# Patient Record
Sex: FEMALE | Race: ASIAN | NOT HISPANIC OR LATINO | URBAN - METROPOLITAN AREA
[De-identification: names, ages, dates, MRNs, and addresses within clinical notes are randomized per-mention and may not be internally consistent; named-entity substitution may affect disease eponyms.]

---

## 2024-02-05 ASSESSMENT — DERMATOLOGY QUALITY OF LIFE (QOL) ASSESSMENT
RATE HOW EMOTIONALLY BOTHERED YOU ARE BY YOUR SKIN PROBLEM (FOR EXAMPLE, WORRY, EMBARRASSMENT, FRUSTRATION): 5
RATE HOW BOTHERED YOU ARE BY SYMPTOMS OF YOUR SKIN PROBLEM (EG, ITCHING, STINGING BURNING, HURTING OR SKIN IRRITATION): 3
RATE HOW BOTHERED YOU ARE BY EFFECTS OF YOUR SKIN PROBLEMS ON YOUR ACTIVITIES (EG, GOING OUT, ACCOMPLISHING WHAT YOU WANT, WORK ACTIVITIES OR YOUR RELATIONSHIPS WITH OTHERS): 0 - NEVER BOTHERED
RATE HOW BOTHERED YOU ARE BY SYMPTOMS OF YOUR SKIN PROBLEM (EG, ITCHING, STINGING BURNING, HURTING OR SKIN IRRITATION): 3
RATE HOW BOTHERED YOU ARE BY EFFECTS OF YOUR SKIN PROBLEMS ON YOUR ACTIVITIES (EG, GOING OUT, ACCOMPLISHING WHAT YOU WANT, WORK ACTIVITIES OR YOUR RELATIONSHIPS WITH OTHERS): 0 - NEVER BOTHERED
RATE HOW EMOTIONALLY BOTHERED YOU ARE BY YOUR SKIN PROBLEM (FOR EXAMPLE, WORRY, EMBARRASSMENT, FRUSTRATION): 5

## 2024-02-07 ENCOUNTER — OFFICE VISIT (OUTPATIENT)
Dept: DERMATOLOGY | Facility: CLINIC | Age: 24
End: 2024-02-07
Payer: COMMERCIAL

## 2024-02-07 DIAGNOSIS — L30.9 DERMATITIS OF LIP: Primary | ICD-10-CM

## 2024-02-07 DIAGNOSIS — K13.0 PERLECHE: ICD-10-CM

## 2024-02-07 PROCEDURE — 99204 OFFICE O/P NEW MOD 45 MIN: CPT | Performed by: STUDENT IN AN ORGANIZED HEALTH CARE EDUCATION/TRAINING PROGRAM

## 2024-02-07 PROCEDURE — 1036F TOBACCO NON-USER: CPT | Performed by: STUDENT IN AN ORGANIZED HEALTH CARE EDUCATION/TRAINING PROGRAM

## 2024-02-07 RX ORDER — HYDROCORTISONE 25 MG/G
1 OINTMENT TOPICAL 2 TIMES DAILY
COMMUNITY
Start: 2023-09-08

## 2024-02-07 RX ORDER — FLUTICASONE PROPIONATE 50 MCG
2 SPRAY, SUSPENSION (ML) NASAL
COMMUNITY
Start: 2021-04-19 | End: 2024-10-30

## 2024-02-07 RX ORDER — KETOCONAZOLE 20 MG/ML
1 SHAMPOO, SUSPENSION TOPICAL 2 TIMES WEEKLY
COMMUNITY
Start: 2023-11-12

## 2024-02-07 RX ORDER — CLOBETASOL PROPIONATE 0.46 MG/ML
2 SOLUTION TOPICAL 2 TIMES DAILY
COMMUNITY
Start: 2023-09-08

## 2024-02-07 RX ORDER — NORETHINDRONE ACETATE AND ETHINYL ESTRADIOL, ETHINYL ESTRADIOL AND FERROUS FUMARATE 1MG-10(24)
1 KIT ORAL DAILY
COMMUNITY

## 2024-02-07 NOTE — PROGRESS NOTES
Subjective   Chrystal Ortiz is a 23 y.o. female who presents for the following: Eczema (Dx with eczema at 10 years of age.  Currently using Hydrocortisone ointment 2.5% 1-2 times daily for perioral area, 2 weeks on and 1 week off (has been using since October 2023), does not note much improvement.  Notes she went to another dermatologist and has body eczema under control with Clobetasol. Also using Ketoconazole shampoo and Clobetasol solution for scalp.)      Objective   Well appearing patient in no apparent distress; mood and affect are within normal limits.    A focused examination was performed including head, including the scalp, face, neck, nose, ears, eyelids, and lips. All findings within normal limits unless otherwise noted below.    Left Oral Commissure, Right Oral Commissure, Right Upper Cutaneous Lip  At corners of mouth - erythematous edematous and fissured papules      Assessment/Plan   Dermatitis of lip    Related Procedures  Follow Up In Dermatology - Established Patient    Related Medications  mupirocin 2 % ointment kit  Apply 1 Application topically 2 times a day.    Perleche  Right Upper Cutaneous Lip; Left Oral Commissure; Right Oral Commissure    Lip dermatitis, perleche  Discussed nature of condition, can be related to wearing retainer at night, and moisture accumulation at corners of mouth, leading to chronic irritant dermatitis, sometimes complicated by yeast or bacterial overgrowth  Advised applying vaseline liberally to lips and cutaneous lips at night before bedtime. Consider trying donut pillow to encourage sleeping on back. Apply mupirocin 2% oint 2-3 times per day to the affected areas. Discussed we may need to add back in topical steroid or anti-yeast if insufficient improvement at next visit  Discussed less common reasons for dermatitis of lips including beeswax, cinnamon, or other flavorants in toothpaste, chewing gum, lip balm. She is unsure of toothpaste, but does not use flavored lip  balm (only aquaphor).     Rtc 3-4 weeks to re-assess          Scribe Attestation  By signing my name below, I, Dominique Tieno LPN , Scribe   attest that this documentation has been prepared under the direction and in the presence of Glenn Palomo MD.

## 2024-02-27 NOTE — PROGRESS NOTES
Subjective   Chrystal Otriz is a 23 y.o. female who presents for the following: Dermatitis (Follow up lip dermatitis 2/7/24.  Pt is currently using Aquaphor and vaseline multiple times daily and mixing mupirocin with hydrocortisone 2.5%. Notes improvement.  No longer has burning sensation from Mupirocin alone.)    Good improvement over the last 14 days of using products. Happy with results so far    Objective   Well appearing patient in no apparent distress; mood and affect are within normal limits.    A focused examination was performed including head, including the scalp, face, neck, nose, ears, eyelids, and lips. All findings within normal limits unless otherwise noted below.    decreased erythema, fissuring and pigmentation at corners of mouth    Assessment/Plan   Dermatitis of lip    Related Procedures  Follow Up In Dermatology - Established Patient  Follow Up In Dermatology - Established Patient    Related Medications  mupirocin 2 % ointment kit  Apply 1 Application topically 2 times a day.       [place]  Right Upper Cutaneous Lip; Left Oral Commissure; Right Oral Commissure   Lip dermatitis, ray  Advised applying vaseline liberally to lips and cutaneous lips at night before bedtime.   Continue to apply mupirocin 2% oint and hydrocortisone 2.5% oint times per day to the affected areas until resolved.   Will see if we need to add anti-yeast if insufficient improvement at next visit      Scribe Attestation  By signing my name below, Dominique BUCKLEY LPN , Scribe   attest that this documentation has been prepared under the direction and in the presence of Glenn Palomo MD.

## 2024-02-28 ENCOUNTER — OFFICE VISIT (OUTPATIENT)
Dept: DERMATOLOGY | Facility: CLINIC | Age: 24
End: 2024-02-28
Payer: COMMERCIAL

## 2024-02-28 DIAGNOSIS — L30.9 DERMATITIS OF LIP: ICD-10-CM

## 2024-02-28 PROCEDURE — 1036F TOBACCO NON-USER: CPT | Performed by: STUDENT IN AN ORGANIZED HEALTH CARE EDUCATION/TRAINING PROGRAM

## 2024-02-28 PROCEDURE — 99213 OFFICE O/P EST LOW 20 MIN: CPT | Performed by: STUDENT IN AN ORGANIZED HEALTH CARE EDUCATION/TRAINING PROGRAM

## 2024-04-10 ENCOUNTER — OFFICE VISIT (OUTPATIENT)
Dept: DERMATOLOGY | Facility: CLINIC | Age: 24
End: 2024-04-10
Payer: COMMERCIAL

## 2024-04-10 DIAGNOSIS — L20.89 FLEXURAL ATOPIC DERMATITIS: Primary | ICD-10-CM

## 2024-04-10 DIAGNOSIS — L30.9 DERMATITIS OF LIP: ICD-10-CM

## 2024-04-10 DIAGNOSIS — L21.9 SEBORRHEIC DERMATITIS: ICD-10-CM

## 2024-04-10 PROCEDURE — 99214 OFFICE O/P EST MOD 30 MIN: CPT | Performed by: STUDENT IN AN ORGANIZED HEALTH CARE EDUCATION/TRAINING PROGRAM

## 2024-04-10 RX ORDER — CLOBETASOL PROPIONATE 0.5 MG/G
CREAM TOPICAL
Qty: 60 G | Refills: 3 | Status: SHIPPED | OUTPATIENT
Start: 2024-04-10

## 2024-04-10 RX ORDER — CLOBETASOL PROPIONATE 0.46 MG/ML
SOLUTION TOPICAL
Qty: 50 ML | Refills: 3 | Status: SHIPPED | OUTPATIENT
Start: 2024-04-10

## 2024-04-10 NOTE — PROGRESS NOTES
Subjective   Chrystal Ortiz is a 23 y.o. female who presents for the following: Dermatitis (LV: 2/28/24.  Pt notes improvement. Currently using Mupirocin mixed with Hydrocortisone every day.)    Good improvement over the last 14 days of using products. Happy with results so far    Objective   Well appearing patient in no apparent distress; mood and affect are within normal limits.    A focused examination was performed including head, including the scalp, face, neck, nose, ears, eyelids, and lips. All findings within normal limits unless otherwise noted below.    Scalp  Mild erythema with overlying greasy scale.    -decreased erythema, fissuring and pigmentation at corners of mouth  -Reports recurrent dermatitis in AC fossae in summertime; nothing today; uses clobetasol 0.05% cream with good results        Assessment/Plan   Flexural atopic dermatitis    Related Medications  clobetasol (Temovate) 0.05 % cream  Apply to affected areas on body twice daily for up to 2 weeks as needed for flares. Not for face/groin.    Dermatitis of lip    Related Procedures  Follow Up In Dermatology - Established Patient    Seborrheic dermatitis  Scalp    Reassured of benign nature of condition  Discussed relationship with seborrhea and overgrowth/reaction to yeast that normally lives on the skin   Advised treatment with ketconazole 2% shampoo 1-2 times per week until clear, then may decrease to 1 time per week for maintenance. Advised to rub directly onto scalp, lather, leave on for 5 min then rinse off.    Can continue clobetasol 0.05% soln once per week to affected areas. Counseled on risks of longterm continuous use, and advised to minimize use to only when needed. Once per week as she is doing now is acceptable, and can decrease further if able in future    clobetasol (Temovate) 0.05 % external solution - Scalp  Apply once per week as needed for flares to scalp       Perleche  Right Upper Cutaneous Lip; Left Oral Commissure; Right  Oral Commissure   Lip dermatitis, perleche  continue applying vaseline liberally to lips and cutaneous lips at night before bedtime.   Continue to apply mupirocin 2% oint and hydrocortisone 2.5% oint once per day to the affected areas until smooth, then stop.   Given significant improvement, no need for additional treatment at this time      Scribe Attestation  By signing my name below, IDominique LPN , Scribe   attest that this documentation has been prepared under the direction and in the presence of Glenn Palomo MD.

## 2024-08-13 NOTE — PROGRESS NOTES
Subjective   Chrystal Ortiz is a 24 y.o. female who presents for the following: Seborrheic Dermatitis (LV: 4/10/24. Present to scalp.  Using Ketoconazole 2% shampoo PRN, Clobetasol 0.05% solution PRN.  Doing well and has no itching.), Flexural atopic dermatitis (Currently using Clobetasol 0.05% cream BID PRN.  Resolved now and has no further areas of flaring.), and Dermatitis (Present to lips.  Using Vaseline multiple times daily.  Previously used mupirocin 2% ointment and hydrocortisone 2.5% ointment.  Did slowly attempt to decrease frequency, noticed flaring more once she was at every other week.  Started using every other day about 1-2 weeks ago with improvement, occasionally notices flares.)    The following portions of the chart were reviewed this encounter and updated as appropriate:         Review of Systems: No other skin or systemic complaints.    Objective   Well appearing patient in no apparent distress; mood and affect are within normal limits.    A focused examination was performed including upper extremities, including the arms, hands, fingers, and fingernails and head, including the scalp, face, neck, nose, ears, eyelids, and lips. All findings within normal limits unless otherwise noted below.    AC fossae- clear on exam today  Left Oral Commissure, Right Oral Commissure, Right Upper Cutaneous Lip  At corners of mouth - erythematous edematous and fissured thin plaques    Scalp  resolved      Assessment/Plan   Perleche  Right Upper Cutaneous Lip; Left Oral Commissure; Right Oral Commissure    Lip dermatitis, perleche  Discussed nature of condition, can be related to wearing retainer at night, and moisture accumulation at corners of mouth, leading to chronic irritant dermatitis, sometimes complicated by yeast or bacterial overgrowth  Advised applying vaseline liberally to lips and cutaneous lips at night before bedtime. Continue trying donut pillow to encourage sleeping on back.   Can discontinue  mupirocin 2% oint for now- may revisit   Use hyrocortisone 2.5% oint BID for a few days then stop, and switch to protopic oint BID until resolved. Discussed burning/stinging sensation which is expected to improve over time    Use aquaphor or vaseline for lip balm prn. Avoid chapsticks, flavored toothpaste, chewing gum      Rtc 2-3 months to re-assess      Flexural atopic dermatitis    Related Medications  clobetasol (Temovate) 0.05 % cream  Apply to affected areas on body twice daily for up to 2 weeks as needed for flares. Not for face/groin.    Seborrheic dermatitis  Scalp    Well controlled    Continue treatment with ketconazole 2% shampoo 1-2 times per week     Related Medications  clobetasol (Temovate) 0.05 % external solution  Apply once per week as needed for flares to scalp    Dermatitis of lip        RTC 3 months or sooner PRN    Scribe Attestation  By signing my name below, IDominique LPN , Scribe   attest that this documentation has been prepared under the direction and in the presence of Glenn Palomo MD.

## 2024-08-14 ENCOUNTER — TELEPHONE (OUTPATIENT)
Dept: DERMATOLOGY | Facility: CLINIC | Age: 24
End: 2024-08-14

## 2024-08-14 ENCOUNTER — APPOINTMENT (OUTPATIENT)
Dept: DERMATOLOGY | Facility: CLINIC | Age: 24
End: 2024-08-14
Payer: COMMERCIAL

## 2024-08-14 DIAGNOSIS — L30.9 DERMATITIS OF LIP: ICD-10-CM

## 2024-08-14 DIAGNOSIS — K13.0 PERLECHE: Primary | ICD-10-CM

## 2024-08-14 DIAGNOSIS — L20.89 FLEXURAL ATOPIC DERMATITIS: ICD-10-CM

## 2024-08-14 DIAGNOSIS — L21.9 SEBORRHEIC DERMATITIS: ICD-10-CM

## 2024-08-14 PROCEDURE — 99214 OFFICE O/P EST MOD 30 MIN: CPT | Performed by: STUDENT IN AN ORGANIZED HEALTH CARE EDUCATION/TRAINING PROGRAM

## 2024-08-14 RX ORDER — TACROLIMUS 1 MG/G
OINTMENT TOPICAL 2 TIMES DAILY
Qty: 30 G | Refills: 2 | Status: SHIPPED | OUTPATIENT
Start: 2024-08-14 | End: 2025-08-14

## 2024-08-14 NOTE — TELEPHONE ENCOUNTER
PA denied for Tacrolimus 0.1% ointment.  LVM for pt to update and discussed [facility] option ($35.54 at local [facility] where prescription was sent), coupon texted to pt. via [employer] website.  Phone number provided for any further questions or concerns.  Dominique Tineo LPN

## 2024-10-30 ENCOUNTER — APPOINTMENT (OUTPATIENT)
Dept: DERMATOLOGY | Facility: CLINIC | Age: 24
End: 2024-10-30
Payer: COMMERCIAL

## 2024-10-30 DIAGNOSIS — L30.9 DERMATITIS OF LIP: ICD-10-CM

## 2024-10-30 DIAGNOSIS — L21.9 SEBORRHEIC DERMATITIS: ICD-10-CM

## 2024-10-30 DIAGNOSIS — L20.89 FLEXURAL ATOPIC DERMATITIS: ICD-10-CM

## 2024-10-30 DIAGNOSIS — K13.0 PERLECHE: Primary | ICD-10-CM

## 2024-10-30 PROCEDURE — 99213 OFFICE O/P EST LOW 20 MIN: CPT | Performed by: STUDENT IN AN ORGANIZED HEALTH CARE EDUCATION/TRAINING PROGRAM

## 2024-10-30 RX ORDER — KETOCONAZOLE 20 MG/ML
SHAMPOO, SUSPENSION TOPICAL
Qty: 120 ML | Refills: 11 | Status: SHIPPED | OUTPATIENT
Start: 2024-10-31

## 2025-10-29 ENCOUNTER — APPOINTMENT (OUTPATIENT)
Dept: DERMATOLOGY | Facility: CLINIC | Age: 25
End: 2025-10-29
Payer: COMMERCIAL